# Patient Record
Sex: FEMALE | Race: BLACK OR AFRICAN AMERICAN | Employment: PART TIME | URBAN - METROPOLITAN AREA
[De-identification: names, ages, dates, MRNs, and addresses within clinical notes are randomized per-mention and may not be internally consistent; named-entity substitution may affect disease eponyms.]

---

## 2017-08-12 ENCOUNTER — HOSPITAL ENCOUNTER (OUTPATIENT)
Dept: MAMMOGRAPHY | Age: 67
Discharge: HOME | End: 2017-08-12
Payer: COMMERCIAL

## 2017-08-12 DIAGNOSIS — Z13.9 SCREENING FOR CONDITION: ICD-10-CM

## 2017-08-12 PROCEDURE — 77063 BREAST TOMOSYNTHESIS BI: CPT

## 2017-08-24 ENCOUNTER — HOSPITAL ENCOUNTER (OUTPATIENT)
Dept: MAMMOGRAPHY | Age: 67
Discharge: HOME | End: 2017-08-24
Payer: COMMERCIAL

## 2017-08-24 DIAGNOSIS — Z13.820 SCREENING FOR OSTEOPOROSIS: ICD-10-CM

## 2017-08-24 PROCEDURE — 77080 DXA BONE DENSITY AXIAL: CPT

## 2018-08-16 ENCOUNTER — HOSPITAL ENCOUNTER (OUTPATIENT)
Dept: MAMMOGRAPHY | Age: 68
Discharge: HOME | End: 2018-08-18
Payer: COMMERCIAL

## 2018-08-16 DIAGNOSIS — Z12.39 SCREENING BREAST EXAMINATION: ICD-10-CM

## 2018-08-16 PROCEDURE — 77067 SCR MAMMO BI INCL CAD: CPT

## 2019-01-24 ENCOUNTER — HOSPITAL ENCOUNTER (EMERGENCY)
Age: 69
Discharge: HOME | End: 2019-01-24
Attending: EMERGENCY MEDICINE
Payer: MEDICARE

## 2019-01-24 VITALS
SYSTOLIC BLOOD PRESSURE: 155 MMHG | DIASTOLIC BLOOD PRESSURE: 85 MMHG | RESPIRATION RATE: 20 BRPM | TEMPERATURE: 98.5 F | BODY MASS INDEX: 34.31 KG/M2 | OXYGEN SATURATION: 100 % | WEIGHT: 201 LBS | HEART RATE: 83 BPM | HEIGHT: 64 IN

## 2019-01-24 DIAGNOSIS — R04.0 EPISTAXIS: Primary | ICD-10-CM

## 2019-01-24 PROCEDURE — 99283 EMERGENCY DEPT VISIT LOW MDM: CPT

## 2019-01-24 PROCEDURE — 6370000000 HC RX 637 (ALT 250 FOR IP): Performed by: NURSE PRACTITIONER

## 2019-01-24 RX ORDER — LISINOPRIL AND HYDROCHLOROTHIAZIDE 20; 12.5 MG/1; MG/1
1 TABLET ORAL DAILY
COMMUNITY
End: 2021-11-01

## 2019-01-24 RX ORDER — CELECOXIB 200 MG/1
200 CAPSULE ORAL 2 TIMES DAILY
COMMUNITY
End: 2021-11-01

## 2019-01-24 RX ORDER — OXYMETAZOLINE HYDROCHLORIDE 0.05 G/100ML
2 SPRAY NASAL 2 TIMES DAILY
Status: COMPLETED | OUTPATIENT
Start: 2019-01-24 | End: 2019-01-24

## 2019-01-24 RX ORDER — ASPIRIN 81 MG/1
81 TABLET, CHEWABLE ORAL DAILY
COMMUNITY
End: 2021-11-01

## 2019-01-24 RX ORDER — OXYBUTYNIN CHLORIDE 5 MG/1
5 TABLET ORAL 3 TIMES DAILY
COMMUNITY
End: 2021-11-01

## 2019-01-24 RX ORDER — AMLODIPINE BESYLATE 2.5 MG/1
2.5 TABLET ORAL DAILY
COMMUNITY
End: 2021-11-01

## 2019-01-24 RX ADMIN — OXYMETAZOLINE HYDROCHLORIDE 2 SPRAY: 5 SPRAY NASAL at 13:21

## 2019-01-25 ASSESSMENT — ENCOUNTER SYMPTOMS
SORE THROAT: 0
SINUS PRESSURE: 0
RHINORRHEA: 0
NAUSEA: 0
ABDOMINAL PAIN: 0
VOMITING: 0
SHORTNESS OF BREATH: 0
CONSTIPATION: 0
DIARRHEA: 0
COUGH: 0
WHEEZING: 0
COLOR CHANGE: 0

## 2021-11-01 ENCOUNTER — OFFICE VISIT (OUTPATIENT)
Dept: PODIATRY | Age: 71
End: 2021-11-01
Payer: MEDICARE

## 2021-11-01 VITALS — HEIGHT: 66 IN | WEIGHT: 204 LBS | BODY MASS INDEX: 32.78 KG/M2 | RESPIRATION RATE: 16 BRPM

## 2021-11-01 DIAGNOSIS — D23.71 BENIGN NEOPLASM OF SKIN OF RIGHT FOOT: Primary | ICD-10-CM

## 2021-11-01 DIAGNOSIS — M79.605 PAIN IN BOTH LOWER EXTREMITIES: ICD-10-CM

## 2021-11-01 DIAGNOSIS — D23.72 BENIGN NEOPLASM OF SKIN OF LEFT FOOT: ICD-10-CM

## 2021-11-01 DIAGNOSIS — M79.604 PAIN IN BOTH LOWER EXTREMITIES: ICD-10-CM

## 2021-11-01 PROCEDURE — G8484 FLU IMMUNIZE NO ADMIN: HCPCS | Performed by: PODIATRIST

## 2021-11-01 PROCEDURE — 1090F PRES/ABSN URINE INCON ASSESS: CPT | Performed by: PODIATRIST

## 2021-11-01 PROCEDURE — G8417 CALC BMI ABV UP PARAM F/U: HCPCS | Performed by: PODIATRIST

## 2021-11-01 PROCEDURE — G8427 DOCREV CUR MEDS BY ELIG CLIN: HCPCS | Performed by: PODIATRIST

## 2021-11-01 PROCEDURE — 99203 OFFICE O/P NEW LOW 30 MIN: CPT | Performed by: PODIATRIST

## 2021-11-01 PROCEDURE — 4040F PNEUMOC VAC/ADMIN/RCVD: CPT | Performed by: PODIATRIST

## 2021-11-01 PROCEDURE — 1123F ACP DISCUSS/DSCN MKR DOCD: CPT | Performed by: PODIATRIST

## 2021-11-01 PROCEDURE — 3017F COLORECTAL CA SCREEN DOC REV: CPT | Performed by: PODIATRIST

## 2021-11-01 PROCEDURE — G8399 PT W/DXA RESULTS DOCUMENT: HCPCS | Performed by: PODIATRIST

## 2021-11-01 PROCEDURE — 17110 DESTRUCTION B9 LES UP TO 14: CPT | Performed by: PODIATRIST

## 2021-11-01 PROCEDURE — 4004F PT TOBACCO SCREEN RCVD TLK: CPT | Performed by: PODIATRIST

## 2021-11-01 RX ORDER — TRAMADOL HYDROCHLORIDE 50 MG/1
TABLET ORAL
COMMUNITY
Start: 2021-08-09

## 2021-11-01 RX ORDER — SOLIFENACIN SUCCINATE 5 MG/1
TABLET, FILM COATED ORAL
COMMUNITY
Start: 2021-10-05

## 2021-11-01 RX ORDER — LOSARTAN POTASSIUM 100 MG/1
100 TABLET ORAL DAILY
COMMUNITY
Start: 2021-08-09

## 2021-11-01 NOTE — PROGRESS NOTES
[facility] PHYSICIANS  [facility]  [address] [facility] [address] [place] [address]  Dept: [phone]  Dept Fax: [phone]    NEW PATIENT PROGRESS NOTE  Date of patient's visit: 11/1/2021  Patient's Name:  Laura Chilel YOB: 1950            Patient Care Team:  CATHI Price CNP as PCP - General (Family Nurse Practitioner)  Samia Webb DPM as Physician (Podiatry)        Chief Complaint   Patient presents with    New Patient    Foot Pain    Benign Neoplasm         HPI:   Laura Chilel is a 78 y.o. female who presents to the office today complaining of bilateral foot pain due to lesions. Symptoms began many year(s) ago. Patient relates pain is Present. Pain is rated 1 out of 10 and is described as intermittent, mild. Treatments prior to today's visit include: none. Currently denies F/C/N/V. Pt's primary care physician is CATHI Price CNP last seen 06/18/2021     Allergies   Allergen Reactions    Aleve [Naproxen Sodium]      nosebleed    Lisinopril-Hydrochlorothiazide Swelling    Neurontin [Gabapentin]      halucinations    Sulfamethoxazole-Trimethoprim Swelling     Lip swelling       Past Medical History:   Diagnosis Date    Hypertension        Prior to Admission medications    Medication Sig Start Date End Date Taking? Authorizing Provider   losartan (COZAAR) 100 MG tablet  8/9/21  Yes Historical Provider, MD   solifenacin (VESICARE) 5 MG tablet  10/5/21  Yes Historical Provider, MD   traMADol (ULTRAM) 50 MG tablet  8/9/21  Yes Historical Provider, MD       Past Surgical History:   Procedure Laterality Date    HYSTERECTOMY      JOINT REPLACEMENT      bilateral knees       No family history on file.     Social History     Tobacco Use    Smoking status: Never Smoker   Substance Use Topics    Alcohol use: Yes     Comment: social       Review of Systems    Review of Systems:   History obtained from chart review and the patient  General ROS: negative for - chills, fatigue, fever, night sweats or weight gain  Constitutional: Negative for chills, diaphoresis, fatigue, fever and unexpected weight change. Musculoskeletal: Positive for arthralgias, gait problem and joint swelling. Neurological ROS: negative for - behavioral changes, confusion, headaches or seizures. Negative for weakness and numbness. Dermatological ROS: negative for - mole changes, rash  Cardiovascular: Negative for leg swelling. Gastrointestinal: Negative for constipation, diarrhea, nausea and vomiting. Lower Extremity Physical Examination:   Vitals:   Vitals:    11/01/21 0844   Resp: 16     General: AAO x 3 in NAD. Dermatologic Exam:  Soft tissue lesion to the plantar right and left foot with central core and petechiae. Pain on palpation of lesion. Musculoskeletal:     1st MPJ ROM decreased, Bilateral.  Muscle strength 5/5, Bilateral.  Pain present upon palpation of skin lesions. Medial longitudinal arch, Bilateral WNL. Ankle ROM WNL,Bilateral.    Dorsally contracted digits absent digits 1-5 Bilateral.     Vascular: DP and PT pulses palpable 2/4, Bilateral.  CFT <3 seconds, Bilateral.  Hair growth present to the level of the digits, Bilateral.  Edema absent, Bilateral.  Varicosities absent, Bilateral. Erythema absent, Bilateral    Neurological: Sensation intact to light touch to level of digits, Bilateral.  Protective sensation intact 10/10 sites via 5.07/10g Austin-Sobeida Monofilament, Bilateral.  negative Tinel's, Bilateral.  negative Valleix sign, Bilateral.      Integument: Warm, dry, supple, Bilateral.  Open lesion absent, Bilateral.  Interdigital maceration absent to web spaces 1-4, Bilateral.  Nails are normal in length, thickness and color 1-5 bilateral.  Fissures absent, Bilateral.       Asessment: Patient is a 78 y.o. female with:    Diagnosis Orders   1. Benign neoplasm of skin of right foot  89817 - CT DESTRUCTION BENIGN LESIONS UP TO 14   2. Benign neoplasm of skin of left foot  02147 - [place] DESTRUCTION BENIGN LESIONS UP TO 14   3. Pain in both lower extremities  53851 - [place] DESTRUCTION BENIGN LESIONS UP TO 14         Plan: Patient examined and evaluated. Current condition and treatment options discussed in detail. Discussed conservative and surgical options with the patient. The lesions were partially excised via 15 blade and silver nitrate was applied under occlusion. The patient tolerated the procedure well and without complication. Advised patient to use vasoline to the area after tomorrow to prevent surrounding tissue irritation. All labs were reviewed and all imagining including the above findings were reviewed PRIOR to the patients arrival and with the patient today. Previous patient encounter was reviewed. Encounters from the patients other medical providers were reviewed and noted. Time was spent educating the patienton proper care of the feet and ankles. All the above diagnosis were addressed at todays visit and all questions were answered. A total of 30 minutes was spent with this patients encounter which included charting after the patients visit    Advised pt to avoid walking barefoot. Verbal and written instructions given to patient. Contact office with any questions/problems/concerns. RTC in 2month(s).     11/1/2021    Electronically signed by Maricarmen Underwood DPM on 11/1/2021 at 8:50 AM  11/1/2021

## 2022-05-18 ENCOUNTER — HOSPITAL ENCOUNTER (OUTPATIENT)
Dept: PREADMISSION TESTING | Age: 72
Discharge: HOME | End: 2022-05-22
Payer: MEDICARE

## 2022-05-18 VITALS
WEIGHT: 200 LBS | HEART RATE: 73 BPM | RESPIRATION RATE: 20 BRPM | OXYGEN SATURATION: 97 % | TEMPERATURE: 98.6 F | DIASTOLIC BLOOD PRESSURE: 77 MMHG | BODY MASS INDEX: 35.44 KG/M2 | SYSTOLIC BLOOD PRESSURE: 155 MMHG | HEIGHT: 63 IN

## 2022-05-18 LAB
ANION GAP SERPL CALCULATED.3IONS-SCNC: 10 MMOL/L (ref 9–17)
BUN BLDV-MCNC: 18 MG/DL (ref 8–23)
BUN/CREAT BLD: 29 (ref 9–20)
CALCIUM SERPL-MCNC: 9 MG/DL (ref 8.6–10.4)
CHLORIDE BLD-SCNC: 105 MMOL/L (ref 98–107)
CO2: 23 MMOL/L (ref 20–31)
CREAT SERPL-MCNC: 0.63 MG/DL (ref 0.5–0.9)
EKG ATRIAL RATE: 66 BPM
EKG P AXIS: 46 DEGREES
EKG P-R INTERVAL: 146 MS
EKG Q-T INTERVAL: 408 MS
EKG QRS DURATION: 88 MS
EKG QTC CALCULATION (BAZETT): 427 MS
EKG R AXIS: 60 DEGREES
EKG T AXIS: 36 DEGREES
EKG VENTRICULAR RATE: 66 BPM
GFR AFRICAN AMERICAN: >60 ML/MIN
GFR NON-AFRICAN AMERICAN: >60 ML/MIN
GFR SERPL CREATININE-BSD FRML MDRD: ABNORMAL ML/MIN/{1.73_M2}
GLUCOSE BLD-MCNC: 99 MG/DL (ref 70–99)
HCT VFR BLD CALC: 43.8 % (ref 36.3–47.1)
HEMOGLOBIN: 13.7 G/DL (ref 11.9–15.1)
MCH RBC QN AUTO: 26.3 PG (ref 25.2–33.5)
MCHC RBC AUTO-ENTMCNC: 31.3 G/DL (ref 28.4–34.8)
MCV RBC AUTO: 84.2 FL (ref 82.6–102.9)
NRBC AUTOMATED: 0 PER 100 WBC
PDW BLD-RTO: 14.6 % (ref 11.8–14.4)
PLATELET # BLD: 219 K/UL (ref 138–453)
PMV BLD AUTO: 9.2 FL (ref 8.1–13.5)
POTASSIUM SERPL-SCNC: 4.1 MMOL/L (ref 3.7–5.3)
RBC # BLD: 5.2 M/UL (ref 3.95–5.11)
SODIUM BLD-SCNC: 138 MMOL/L (ref 135–144)
WBC # BLD: 5.1 K/UL (ref 3.5–11.3)

## 2022-05-18 PROCEDURE — 93005 ELECTROCARDIOGRAM TRACING: CPT | Performed by: ANESTHESIOLOGY

## 2022-05-18 PROCEDURE — 36415 COLL VENOUS BLD VENIPUNCTURE: CPT

## 2022-05-18 PROCEDURE — 85027 COMPLETE CBC AUTOMATED: CPT

## 2022-05-18 PROCEDURE — 80048 BASIC METABOLIC PNL TOTAL CA: CPT

## 2022-05-18 ASSESSMENT — PAIN SCALES - GENERAL: PAINLEVEL_OUTOF10: 0

## 2022-05-18 NOTE — H&P
Lisinopril-hydrochlorothiazide, Neurontin [gabapentin], and Sulfamethoxazole-trimethoprim    Social History:     Tobacco:    reports that she has never smoked. She has never used smokeless tobacco.  Alcohol:      reports current alcohol use. Drug Use:  reports no history of drug use. Family History:     History reviewed. No pertinent family history. Review of Systems:     Positive and Negative as described in HPI. CONSTITUTIONAL: Negative for fevers, chills, sweats, fatigue, and weight loss. HEENT: Cataract with removal. Wears glasses. Negative for glasses, hearing changes, rhinorrhea, and throat pain. RESPIRATORY: Negative for shortness of breath, cough, congestion, and wheezing. CARDIOVASCULAR: HTN. Negative for chest pain, blood clot, irregular heartbeat, and palpitations. GASTROINTESTINAL: Negative for reflux, nausea, vomiting, diarrhea, constipation, change in bowel habits, and abdominal pain. GENITOURINARY: Frequency. Negative for difficulty of urination, burning with urination  INTEGUMENT: Easy bruising. Negative for rash, skin lesions  HEMATOLOGIC/LYMPHATIC: Bilateral ankle swelling in warm weather. ALLERGIC/IMMUNOLOGIC:  Negative for urticaria and itching. ENDOCRINE: Negative for increase in thirst, increase in urination, and heat or cold intolerance. MUSCULOSKELETAL: See HPI. Arthritis. NEUROLOGICAL: Negative for headaches, dizziness, lightheadedness, numbness, and tingling extremities. BEHAVIOR/PSYCH: Negative for depression and anxiety. Physical Exam:   BP (!) 155/77   Pulse 73   Temp 98.6 °F (37 °C) (Temporal)   Resp 20   Ht 5' 3\" (1.6 m)   Wt 200 lb (90.7 kg)   SpO2 97%   BMI 35.43 kg/m²   No LMP recorded. Patient has had a hysterectomy. No obstetric history on file. No results for input(s): POCGLU in the last 72 hours. General Appearance:  Alert, well appearing, and in no acute distress. Mental status: Oriented to person, place, and time.   Head: Normocephalic and atraumatic. Eye: Wearing reading glasses. No icterus, redness, pupils equal and reactive, extraocular eye movements intact, and conjunctiva clear. Ear:  Hearing grossly intact. Nose:  No drainage noted. Mouth:  Mucous membranes moist.  Neck:  Supple and no carotid bruits noted. Lungs:  Bilateral equal air entry, clear to auscultation, no wheezing, rales or rhonchi, and normal effort. Cardiovascular:  Normal rate, regular rhythm, no murmur, gallop, or rub. Abdomen:  Soft, nontender, nondistended, and active bowel sounds. Neurologic:  Normal speech and cranial nerves II through XII grossly intact. Strength 5/5 bilaterally. Skin:  No gross lesions, rashes, bruising, or bleeding on exposed skin area. Extremities: Trace edema bilateral lower extremities. Posterior tibial pulses 2+ bilaterally. No calf tenderness with palpation. Psych: Normal affect.      Investigations:      Laboratory Testing:  Recent Results (from the past 24 hour(s))   Basic Metabolic Panel    Collection Time: 05/18/22 10:31 AM   Result Value Ref Range    Glucose 99 70 - 99 mg/dL    BUN 18 8 - 23 mg/dL    CREATININE 0.63 0.50 - 0.90 mg/dL    Bun/Cre Ratio 29 (H) 9 - 20    Calcium 9.0 8.6 - 10.4 mg/dL    Sodium 138 135 - 144 mmol/L    Potassium 4.1 3.7 - 5.3 mmol/L    Chloride 105 98 - 107 mmol/L    CO2 23 20 - 31 mmol/L    Anion Gap 10 9 - 17 mmol/L    GFR Non-African American >60 >60 mL/min    GFR African American >60 >60 mL/min    GFR Comment         CBC    Collection Time: 05/18/22 10:31 AM   Result Value Ref Range    WBC 5.1 3.5 - 11.3 k/uL    RBC 5.20 (H) 3.95 - 5.11 m/uL    Hemoglobin 13.7 11.9 - 15.1 g/dL    Hematocrit 43.8 36.3 - 47.1 %    MCV 84.2 82.6 - 102.9 fL    MCH 26.3 25.2 - 33.5 pg    MCHC 31.3 28.4 - 34.8 g/dL    RDW 14.6 (H) 11.8 - 14.4 %    Platelets 767 714 - 472 k/uL    MPV 9.2 8.1 - 13.5 fL    NRBC Automated 0.0 0.0 per 100 WBC   EKG 12 Lead    Collection Time: 05/18/22 10:37 AM   Result Value Ref Range    Ventricular Rate 66 BPM    Atrial Rate 66 BPM    P-R Interval 146 ms    QRS Duration 88 ms    Q-T Interval 408 ms    QTc Calculation (Bazett) 427 ms    P Axis 46 degrees    R Axis 60 degrees    T Axis 36 degrees       Recent Labs     22  1031   HGB 13.7   HCT 43.8   WBC 5.1   MCV 84.2      K 4.1      CO2 23   BUN 18   CREATININE 0.63   GLUCOSE 99       No results for input(s): COVID19 in the last 720 hours. *Please note that labs listed above are the most recent lab values available in EPIC at the time of the visit and additional labs may have been drawn or resulted since that time. Imaging/Diagnostics:    No results found. EK2022: See Epic. Diagnosis:      1. 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  ARTHROPLASTY  RIGHT 5TH TOE      Lucia Be, CATHI - CNP  2022  11:06 AM

## 2022-05-18 NOTE — H&P (VIEW-ONLY)
History and Physical Service   [place] [address]    HISTORY AND PHYSICAL EXAMINATION            Date of Evaluation: 5/18/2022  Patient name:  Mir Fuentes  MRN:   [id number]  YOB: 1950  PCP:    CATHI Carroll CNP    History Obtained From:     Patient, medical records    History of Present Illness: This is Mir Fuentes a 73 y.o. female who presents for a pre-admission testing appointment for an upcoming ARTHROPLASTY  RIGHT 5TH TOE by Lucila Carranza DPM scheduled on 6/2/2022 at 1405 due to [address]. The patient's chief complaint is right foot pain that has progressively worsened over the past several months. Right foot pain is aggravated by wearing shoes and is minimally relieved with rest, taking off her shoes. Denies recent falls and injuries. Functional Capacity per pt:   1) Pt is able to walk 2 city blocks on level ground without SOB. 2) Pt is able to climb 2 flights of stairs without SOB. 3) Pt is able to walk up a hill for 1-2 city blocks without SOB. Past Medical History:     Past Medical History:   Diagnosis Date    Arthritis     Cataract     Hypertension     Incontinence     Wears glasses         Past Surgical History:     Past Surgical History:   Procedure Laterality Date    BREAST BIOPSY Left     markers in place    CATARACT REMOVAL WITH IMPLANT Bilateral     COLONOSCOPY      CYSTOSCOPY      EYE SURGERY      HYSTERECTOMY      JOINT REPLACEMENT      bilateral knees    TOE SURGERY Bilateral     TONSILLECTOMY          Medications Prior to Admission:     Prior to Admission medications    Medication Sig Start Date End Date Taking?  Authorizing Provider   losartan (COZAAR) 100 MG tablet  8/9/21   Historical Provider, MD   solifenacin (VESICARE) 5 MG tablet  10/5/21   Historical Provider, MD   traMADol Birder Mo) 50 MG tablet  8/9/21   Historical Provider, MD        Allergies:     Aleve [naproxen sodium], Lisinopril-hydrochlorothiazide, Neurontin [gabapentin], and Sulfamethoxazole-trimethoprim    Social History:     Tobacco:    reports that she has never smoked. She has never used smokeless tobacco.  Alcohol:      reports current alcohol use. Drug Use:  reports no history of drug use. Family History:     History reviewed. No pertinent family history. Review of Systems:     Positive and Negative as described in HPI. CONSTITUTIONAL: Negative for fevers, chills, sweats, fatigue, and weight loss. HEENT: Cataract with removal. Wears glasses. Negative for glasses, hearing changes, rhinorrhea, and throat pain. RESPIRATORY: Negative for shortness of breath, cough, congestion, and wheezing. CARDIOVASCULAR: HTN. Negative for chest pain, blood clot, irregular heartbeat, and palpitations. GASTROINTESTINAL: Negative for reflux, nausea, vomiting, diarrhea, constipation, change in bowel habits, and abdominal pain. GENITOURINARY: Frequency. Negative for difficulty of urination, burning with urination  INTEGUMENT: Easy bruising. Negative for rash, skin lesions  HEMATOLOGIC/LYMPHATIC: Bilateral ankle swelling in warm weather. ALLERGIC/IMMUNOLOGIC:  Negative for urticaria and itching. ENDOCRINE: Negative for increase in thirst, increase in urination, and heat or cold intolerance. MUSCULOSKELETAL: See HPI. Arthritis. NEUROLOGICAL: Negative for headaches, dizziness, lightheadedness, numbness, and tingling extremities. BEHAVIOR/PSYCH: Negative for depression and anxiety. Physical Exam:   BP (!) 155/77   Pulse 73   Temp 98.6 °F (37 °C) (Temporal)   Resp 20   Ht 5' 3\" (1.6 m)   Wt 200 lb (90.7 kg)   SpO2 97%   BMI 35.43 kg/m²   No LMP recorded. Patient has had a hysterectomy. No obstetric history on file. No results for input(s): POCGLU in the last 72 hours. General Appearance:  Alert, well appearing, and in no acute distress. Mental status: Oriented to person, place, and time.   Head: Normocephalic and atraumatic. Eye: Wearing reading glasses. No icterus, redness, pupils equal and reactive, extraocular eye movements intact, and conjunctiva clear. Ear:  Hearing grossly intact. Nose:  No drainage noted. Mouth:  Mucous membranes moist.  Neck:  Supple and no carotid bruits noted. Lungs:  Bilateral equal air entry, clear to auscultation, no wheezing, rales or rhonchi, and normal effort. Cardiovascular:  Normal rate, regular rhythm, no murmur, gallop, or rub. Abdomen:  Soft, nontender, nondistended, and active bowel sounds. Neurologic:  Normal speech and cranial nerves II through XII grossly intact. Strength 5/5 bilaterally. Skin:  No gross lesions, rashes, bruising, or bleeding on exposed skin area. Extremities: Trace edema bilateral lower extremities. Posterior tibial pulses 2+ bilaterally. No calf tenderness with palpation. Psych: Normal affect.      Investigations:      Laboratory Testing:  Recent Results (from the past 24 hour(s))   Basic Metabolic Panel    Collection Time: 05/18/22 10:31 AM   Result Value Ref Range    Glucose 99 70 - 99 mg/dL    BUN 18 8 - 23 mg/dL    CREATININE 0.63 0.50 - 0.90 mg/dL    Bun/Cre Ratio 29 (H) 9 - 20    Calcium 9.0 8.6 - 10.4 mg/dL    Sodium 138 135 - 144 mmol/L    Potassium 4.1 3.7 - 5.3 mmol/L    Chloride 105 98 - 107 mmol/L    CO2 23 20 - 31 mmol/L    Anion Gap 10 9 - 17 mmol/L    GFR Non-African American >60 >60 mL/min    GFR African American >60 >60 mL/min    GFR Comment         CBC    Collection Time: 05/18/22 10:31 AM   Result Value Ref Range    WBC 5.1 3.5 - 11.3 k/uL    RBC 5.20 (H) 3.95 - 5.11 m/uL    Hemoglobin 13.7 11.9 - 15.1 g/dL    Hematocrit 43.8 36.3 - 47.1 %    MCV 84.2 82.6 - 102.9 fL    MCH 26.3 25.2 - 33.5 pg    MCHC 31.3 28.4 - 34.8 g/dL    RDW 14.6 (H) 11.8 - 14.4 %    Platelets 763 481 - 271 k/uL    MPV 9.2 8.1 - 13.5 fL    NRBC Automated 0.0 0.0 per 100 WBC   EKG 12 Lead    Collection Time: 05/18/22 10:37 AM   Result Value Ref Range    Ventricular Rate 66 BPM    Atrial Rate 66 BPM    P-R Interval 146 ms    QRS Duration 88 ms    Q-T Interval 408 ms    QTc Calculation (Bazett) 427 ms    P Axis 46 degrees    R Axis 60 degrees    T Axis 36 degrees       Recent Labs     22  1031   HGB 13.7   HCT 43.8   WBC 5.1   MCV 84.2      K 4.1      CO2 23   BUN 18   CREATININE 0.63   GLUCOSE 99       No results for input(s): COVID19 in the last 720 hours. *Please note that labs listed above are the most recent lab values available in EPIC at the time of the visit and additional labs may have been drawn or resulted since that time. Imaging/Diagnostics:    No results found. EK2022: See Epic. Diagnosis:      1. 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  ARTHROPLASTY  RIGHT 5TH TOE      CATHI Chávez - CNP  2022  11:06 AM

## 2022-05-18 NOTE — PRE-PROCEDURE INSTRUCTIONS
ARRIVE AT [facility] [address] ON Thursday June 2nd  Arrive at 12:30  Any questions call [phone]    Once you enter the hospital lobby, take the elevators to the second floor. Check-In is at the surgery registration desk. Continue to take your home medications as you normally do up to and including the night before surgery with the exception of any blood thinning medications. Please stop any blood thinning medications as directed by your surgeon or prescribing physician. Failure to stop certain medications may interfere with your scheduled surgery. These may include:  Aspirin, Warfarin (Coumadin), Clopidogrel (Plavix), Ibuprofen (Motrin, Advil), Naproxen (Aleve), Meloxicam (Mobic), Celecoxib (Celebrex), Eliquis, Pradaxa, Xarelto, Effient, Fish Oil, Herbal supplements. Please take the following medication(s) the day of surgery with a small sip of water:  none    PREPARING FOR YOUR SURGERY:     Before surgery, you can play an important role in your own health. Because skin is not sterile, we need to be sure that your skin is as free of germs as possible before surgery by carefully washing before surgery. Preparing or prepping skin before surgery can reduce the risk of a surgical site infection.   Do not shave the area of your body where your surgery will be performed unless you received specific permission from your physician. You will need to shower at home the night before surgery and the morning of surgery with a special soap called chlorhexidine gluconate (CHG*). *Not to be used by people allergic to Chlorhexidine Gluconate (CHG). Following these instructions will help you be sure that your skin is clean before surgery. Instructions on cleaning your skin before surgery: The night before your surgery:      You will need to shower with warm water (not hot) and the CHG soap.  Use a clean wash cloth and a clean towel. Have clean clothes available to put on after the shower.   First wash your hair with regular shampoo. Rinse your hair and body thoroughly to remove the shampoo. Jovani Loser Wash your face with your regular soap or water only. Thoroughly rinse your body with warm water from the neck down.  Turn water off to prevent rinsing the soap off too soon.  With a clean wet washcloth and half of the CHG soap in the bottle, lather your entire body from the neck down. Do not use CHG soap near your eyes or ears to avoid injury to those areas.  Wash thoroughly, paying special attention to the area where your surgery will be performed.  Wash your body gently for five (5) minutes. Avoid scrubbing your skin too hard.  Turn the water back on and rinse your body thoroughly.  Pat yourself dry with a clean, soft towel. Do not apply lotion, cream or powder.  Dress with clean freshly washed clothes. The morning of surgery:     Repeat shower following steps above - using remaining half of CHG soap in bottle. Patient Instructions:    Jovani Loser If you are having any type of anesthesia you are to have nothing to eat or drink after midnight the night before your surgery. This includes gum, mints, water or smoking or chewing tobacco.  The only exception to this is a small sip of water to take with any morning dose of heart, blood pressure, or seizure medications. No alcoholic beverages for 24 hours prior to surgery.  Brush your teeth but do not swallow water.  Bring your eyeglasses and case with you. No contacts are to be worn the day of surgery. You also may bring your hearing aids. Most surgical procedures involving anesthesia will require that you remove your dentures prior to surgery. · Do not wear any jewelry or body piercings day of surgery. Also, NO lotion, perfume or deodorant to be used the day of surgery. No nail polish on the operative extremity (arm/leg surgeries)    · Do not bring any valuables such as jewelry, cash, or credit cards.   If you are staying overnight with us, please bring a small bag of personal items.  Please wear loose, comfortable clothing. If you are potentially going to have a cast or brace bring clothing that will fit over them.  In case of illness - If you have cold or flu like symptoms (high fever, runny nose, sore throat, cough, etc.) rash, nausea, vomiting, loose stools, and/or recent contact with someone who has a contagious disease (chicken pox, measles, etc.) Please call your doctor before coming to the hospital.     Day of Surgery/Procedure:    As a patient at [facility] - INPATIENT you can expect quality medical and nursing care that is centered on your individual needs. Our goal is to make your surgical experience as comfortable as possible    . Transportation After Your Surgery/Procedure: You will need a friend or family member to drive you home after your procedure. Your  must be 25years of age or older and able to sign off on your discharge instructions. A taxi cab or any other form of public transportation is not acceptable. Your friend or family member must stay at the hospital throughout your procedure. Someone must remain with you for the first 24 hours after your surgery if you receive anesthesia or medication. If you do not have someone to stay with you, your procedure may be cancelled.       If you have any other questions regarding your procedure or the day of surgery, please call [phone]      _________________________  ____________________________  Signature (Patient)              Signature (Provider) & date

## 2022-06-02 ENCOUNTER — HOSPITAL ENCOUNTER (OUTPATIENT)
Age: 72
Setting detail: OUTPATIENT SURGERY
Discharge: HOME | End: 2022-06-02
Attending: PODIATRIST | Admitting: PODIATRIST
Payer: MEDICARE

## 2022-06-02 ENCOUNTER — ANESTHESIA (OUTPATIENT)
Dept: OPERATING ROOM | Age: 72
End: 2022-06-02
Payer: MEDICARE

## 2022-06-02 ENCOUNTER — ANESTHESIA EVENT (OUTPATIENT)
Dept: OPERATING ROOM | Age: 72
End: 2022-06-02
Payer: MEDICARE

## 2022-06-02 VITALS
WEIGHT: 200 LBS | TEMPERATURE: 97 F | OXYGEN SATURATION: 99 % | HEIGHT: 63 IN | SYSTOLIC BLOOD PRESSURE: 144 MMHG | DIASTOLIC BLOOD PRESSURE: 88 MMHG | RESPIRATION RATE: 17 BRPM | BODY MASS INDEX: 35.44 KG/M2 | HEART RATE: 66 BPM

## 2022-06-02 DIAGNOSIS — G89.18 ACUTE POST-OPERATIVE PAIN: Primary | ICD-10-CM

## 2022-06-02 DIAGNOSIS — Z98.890 STATUS POST RIGHT FOOT SURGERY: ICD-10-CM

## 2022-06-02 PROCEDURE — 2580000003 HC RX 258: Performed by: ANESTHESIOLOGY

## 2022-06-02 PROCEDURE — 2720000010 HC SURG SUPPLY STERILE: Performed by: PODIATRIST

## 2022-06-02 PROCEDURE — 2500000003 HC RX 250 WO HCPCS: Performed by: NURSE ANESTHETIST, CERTIFIED REGISTERED

## 2022-06-02 PROCEDURE — 6360000002 HC RX W HCPCS: Performed by: NURSE ANESTHETIST, CERTIFIED REGISTERED

## 2022-06-02 PROCEDURE — 3700000000 HC ANESTHESIA ATTENDED CARE: Performed by: PODIATRIST

## 2022-06-02 PROCEDURE — 7100000010 HC PHASE II RECOVERY - FIRST 15 MIN: Performed by: PODIATRIST

## 2022-06-02 PROCEDURE — 7100000011 HC PHASE II RECOVERY - ADDTL 15 MIN: Performed by: PODIATRIST

## 2022-06-02 PROCEDURE — 2709999900 HC NON-CHARGEABLE SUPPLY: Performed by: PODIATRIST

## 2022-06-02 PROCEDURE — 3700000001 HC ADD 15 MINUTES (ANESTHESIA): Performed by: PODIATRIST

## 2022-06-02 PROCEDURE — 2500000003 HC RX 250 WO HCPCS: Performed by: PODIATRIST

## 2022-06-02 PROCEDURE — 3600000002 HC SURGERY LEVEL 2 BASE: Performed by: PODIATRIST

## 2022-06-02 PROCEDURE — 3600000012 HC SURGERY LEVEL 2 ADDTL 15MIN: Performed by: PODIATRIST

## 2022-06-02 RX ORDER — FENTANYL CITRATE 50 UG/ML
INJECTION, SOLUTION INTRAMUSCULAR; INTRAVENOUS PRN
Status: DISCONTINUED | OUTPATIENT
Start: 2022-06-02 | End: 2022-06-02 | Stop reason: SDUPTHER

## 2022-06-02 RX ORDER — LIDOCAINE HYDROCHLORIDE 10 MG/ML
1 INJECTION, SOLUTION EPIDURAL; INFILTRATION; INTRACAUDAL; PERINEURAL
Status: DISCONTINUED | OUTPATIENT
Start: 2022-06-03 | End: 2022-06-02 | Stop reason: HOSPADM

## 2022-06-02 RX ORDER — FENTANYL CITRATE 50 UG/ML
25 INJECTION, SOLUTION INTRAMUSCULAR; INTRAVENOUS EVERY 5 MIN PRN
Status: DISCONTINUED | OUTPATIENT
Start: 2022-06-02 | End: 2022-06-02 | Stop reason: HOSPADM

## 2022-06-02 RX ORDER — LIDOCAINE HYDROCHLORIDE 10 MG/ML
INJECTION, SOLUTION EPIDURAL; INFILTRATION; INTRACAUDAL; PERINEURAL PRN
Status: DISCONTINUED | OUTPATIENT
Start: 2022-06-02 | End: 2022-06-02 | Stop reason: ALTCHOICE

## 2022-06-02 RX ORDER — LIDOCAINE HYDROCHLORIDE 20 MG/ML
INJECTION, SOLUTION EPIDURAL; INFILTRATION; INTRACAUDAL; PERINEURAL PRN
Status: DISCONTINUED | OUTPATIENT
Start: 2022-06-02 | End: 2022-06-02 | Stop reason: SDUPTHER

## 2022-06-02 RX ORDER — BUPIVACAINE HYDROCHLORIDE 5 MG/ML
INJECTION, SOLUTION EPIDURAL; INTRACAUDAL PRN
Status: DISCONTINUED | OUTPATIENT
Start: 2022-06-02 | End: 2022-06-02 | Stop reason: ALTCHOICE

## 2022-06-02 RX ORDER — MIDAZOLAM HYDROCHLORIDE 1 MG/ML
INJECTION INTRAMUSCULAR; INTRAVENOUS PRN
Status: DISCONTINUED | OUTPATIENT
Start: 2022-06-02 | End: 2022-06-02 | Stop reason: SDUPTHER

## 2022-06-02 RX ORDER — OXYCODONE HYDROCHLORIDE AND ACETAMINOPHEN 5; 325 MG/1; MG/1
1 TABLET ORAL EVERY 6 HOURS PRN
Qty: 28 TABLET | Refills: 0 | Status: SHIPPED | OUTPATIENT
Start: 2022-06-02 | End: 2022-06-09

## 2022-06-02 RX ORDER — SODIUM CHLORIDE 9 MG/ML
INJECTION, SOLUTION INTRAVENOUS PRN
Status: DISCONTINUED | OUTPATIENT
Start: 2022-06-02 | End: 2022-06-02 | Stop reason: HOSPADM

## 2022-06-02 RX ORDER — PROPOFOL 10 MG/ML
INJECTION, EMULSION INTRAVENOUS PRN
Status: DISCONTINUED | OUTPATIENT
Start: 2022-06-02 | End: 2022-06-02 | Stop reason: SDUPTHER

## 2022-06-02 RX ORDER — SODIUM CHLORIDE 0.9 % (FLUSH) 0.9 %
5-40 SYRINGE (ML) INJECTION PRN
Status: DISCONTINUED | OUTPATIENT
Start: 2022-06-02 | End: 2022-06-02 | Stop reason: HOSPADM

## 2022-06-02 RX ORDER — SODIUM CHLORIDE 0.9 % (FLUSH) 0.9 %
5-40 SYRINGE (ML) INJECTION EVERY 12 HOURS SCHEDULED
Status: DISCONTINUED | OUTPATIENT
Start: 2022-06-02 | End: 2022-06-02 | Stop reason: HOSPADM

## 2022-06-02 RX ORDER — SODIUM CHLORIDE, SODIUM LACTATE, POTASSIUM CHLORIDE, CALCIUM CHLORIDE 600; 310; 30; 20 MG/100ML; MG/100ML; MG/100ML; MG/100ML
INJECTION, SOLUTION INTRAVENOUS CONTINUOUS
Status: DISCONTINUED | OUTPATIENT
Start: 2022-06-03 | End: 2022-06-02 | Stop reason: HOSPADM

## 2022-06-02 RX ORDER — HYDROMORPHONE HYDROCHLORIDE 1 MG/ML
0.25 INJECTION, SOLUTION INTRAMUSCULAR; INTRAVENOUS; SUBCUTANEOUS EVERY 5 MIN PRN
Status: DISCONTINUED | OUTPATIENT
Start: 2022-06-02 | End: 2022-06-02 | Stop reason: HOSPADM

## 2022-06-02 RX ORDER — PROPOFOL 10 MG/ML
INJECTION, EMULSION INTRAVENOUS CONTINUOUS PRN
Status: DISCONTINUED | OUTPATIENT
Start: 2022-06-02 | End: 2022-06-02 | Stop reason: SDUPTHER

## 2022-06-02 RX ORDER — ONDANSETRON 2 MG/ML
4 INJECTION INTRAMUSCULAR; INTRAVENOUS
Status: DISCONTINUED | OUTPATIENT
Start: 2022-06-02 | End: 2022-06-02 | Stop reason: HOSPADM

## 2022-06-02 RX ORDER — CEFAZOLIN SODIUM 1 G/3ML
INJECTION, POWDER, FOR SOLUTION INTRAMUSCULAR; INTRAVENOUS PRN
Status: DISCONTINUED | OUTPATIENT
Start: 2022-06-02 | End: 2022-06-02 | Stop reason: SDUPTHER

## 2022-06-02 RX ADMIN — Medication 25 MCG: at 14:15

## 2022-06-02 RX ADMIN — SODIUM CHLORIDE, POTASSIUM CHLORIDE, SODIUM LACTATE AND CALCIUM CHLORIDE: 600; 310; 30; 20 INJECTION, SOLUTION INTRAVENOUS at 12:32

## 2022-06-02 RX ADMIN — PROPOFOL 50 MCG/KG/MIN: 10 INJECTION, EMULSION INTRAVENOUS at 14:08

## 2022-06-02 RX ADMIN — MIDAZOLAM 1 MG: 1 INJECTION INTRAMUSCULAR; INTRAVENOUS at 14:05

## 2022-06-02 RX ADMIN — LIDOCAINE HYDROCHLORIDE 100 MG: 20 INJECTION, SOLUTION EPIDURAL; INFILTRATION; INTRACAUDAL; PERINEURAL at 14:08

## 2022-06-02 RX ADMIN — PROPOFOL 50 MG: 10 INJECTION, EMULSION INTRAVENOUS at 14:08

## 2022-06-02 RX ADMIN — PROPOFOL 20 MG: 10 INJECTION, EMULSION INTRAVENOUS at 14:15

## 2022-06-02 RX ADMIN — MIDAZOLAM 1 MG: 1 INJECTION INTRAMUSCULAR; INTRAVENOUS at 14:15

## 2022-06-02 RX ADMIN — CEFAZOLIN SODIUM 2000 MG: 1 INJECTION, POWDER, FOR SOLUTION INTRAMUSCULAR; INTRAVENOUS at 14:13

## 2022-06-02 RX ADMIN — Medication 25 MCG: at 14:08

## 2022-06-02 ASSESSMENT — PAIN DESCRIPTION - DESCRIPTORS: DESCRIPTORS: ACHING

## 2022-06-02 ASSESSMENT — PAIN - FUNCTIONAL ASSESSMENT: PAIN_FUNCTIONAL_ASSESSMENT: 0-10

## 2022-06-02 NOTE — ANESTHESIA POSTPROCEDURE EVALUATION
Department of Anesthesiology  Postprocedure Note    Patient: Mal Solo  MRN: [id number]  YOB: 1950  Date of evaluation: 6/2/2022  Time:  4:27 PM     Procedure Summary     Date: 06/02/22 Room / Location: [address] [place] [address] - INPATIENT    Anesthesia Start: 7133 Anesthesia Stop: 4487    Procedure: ARTHROPLASTY  RIGHT 5TH TOE (Right Foot) Diagnosis: (DX DEFORMED 5TH RIGHT)    Surgeons: Jessie Inman DPM Responsible Provider: Milagros Pelayo MD    Anesthesia Type: MAC ASA Status: 2          Anesthesia Type: MAC    Mira Phase I:      Mira Phase II: Mira Score: 8    Last vitals: Reviewed and per EMR flowsheets.        Anesthesia Post Evaluation    Patient location during evaluation: PACU  Patient participation: complete - patient participated  Level of consciousness: awake  Airway patency: patent  Nausea & Vomiting: no nausea  Complications: no  Cardiovascular status: blood pressure returned to baseline  Respiratory status: acceptable  Hydration status: euvolemic  Comments: Multimodal analgesia pain management as indicated by procedure  Multimodal analgesia pain management approach

## 2022-06-02 NOTE — PROGRESS NOTES
Pharmacy Note - Renal dose adjustment made per P/T protocol    Original order:  Ancef 1g Pre-OP    Estimated Creatinine Clearance: 90 mL/min (based on SCr of 0.63 mg/dL). No results for input(s): BUN, CREATININE, PLT, INR in the last 72 hours. Renally adjusted order:  Ancef 2g Pre-OP based on weight of 90.7kg. Please call pharmacy with any questions.     Thank you,  Beryle Castellani, [facility]  6/2/2022 12:54 PM

## 2022-06-02 NOTE — INTERVAL H&P NOTE
Interval H&P Note    Pt Name: Nikole Marcelo  MRN: [id number]  YOB: 1950  Date of evaluation: 6/2/2022      [x] I have reviewed the H&P by JESIKA Be CNP for an Interval History and Physical note. [x] I have examined  Nikole Marcelo  There are no changes to the patient who is scheduled for ARTHROPLASTY  RIGHT 5TH TOE by Kae Manuel DPM for DX DEFORMED 5TH RIGHT. The patient denies new health changes, fever, chills, wheezing, cough, increased SOB, chest pain, open sores or wounds. No DM or use of blood thinning medication     Vital signs: BP (!) 163/94   Pulse 73   Temp 97.8 °F (36.6 °C) (Temporal)   Resp 18   Ht 5' 3\" (1.6 m)   Wt 200 lb (90.7 kg)   SpO2 98%   BMI 35.43 kg/m²      Allergies:  Aleve [naproxen sodium], Lisinopril-hydrochlorothiazide, Neurontin [gabapentin], and Sulfamethoxazole-trimethoprim    Medications:    Prior to Admission medications    Medication Sig Start Date End Date Taking? Authorizing Provider   losartan (COZAAR) 100 MG tablet  8/9/21   Historical Provider, MD   solifenacin (VESICARE) 5 MG tablet  10/5/21   Historical Provider, MD   traMADol Marcelyn Kidney) 50 MG tablet  8/9/21   Historical Provider, MD         This is a 73 y.o.obese female who is pleasant, cooperative, alert and oriented x3, in no acute distress. Glasses    Heart: Heart sounds are normal.  HR 73 regular rate and rhythm without murmur, gallop or rub. Lungs: Normal respiratory effort with equal expansion, good air exchange, unlabored and clear to auscultation without wheezes or rales bilaterally   Abdomen: soft, obese, nontender, nondistended with bowel sounds. Extremities: 1+ ankle edema bilaterally  DP pulses palpable  No calf tenderness    Labs:  Recent Labs     05/18/22  1031   HGB 13.7   HCT 43.8   WBC 5.1   MCV 84.2         K 4.1      CO2 23   BUN 18   CREATININE 0.63   GLUCOSE 99       No results for input(s): COVID19 in the last 720 hours.     Shell Fuller APRN - CNP  Electronically signed 6/2/2022 at 12:15 PM

## 2022-06-02 NOTE — OP NOTE
PODIATRY OP NOTE    PATIENT NAME: Leila Romero  YOB: 1950  -  73 y.o. female  MRN: [id number]  DATE: 6/2/2022  BILLING #: [id number]    Surgeon(s):  Minerva Rivera DPM     ASSISTANTS: John Cardenas DPM PGY-1    PRE-OP DIAGNOSIS:   1. 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 5th digit pain, right foot    POST-OP DIAGNOSIS: Same as above. PROCEDURE:   1. Derotational arthroplasty, 5th digit, right foot  2. 5th MPJ tenotomy and capsulotomy     ANESTHESIA: MAC    HEMOSTASIS: Pneumatic ankle tourniquet @ 250 mmHg for 29 minutes. ESTIMATED BLOOD LOSS: Less than 3cc. MATERIALS:   * No implants in log *    INJECTABLES: 10cc 1:1 mix of 0.5% marcaine plain and 1% lidocaine plain    SPECIMEN:   * No specimens in log *    COMPLICATIONS: none    FINDINGS: right 5th digit was noted to be in a rectus position post procedure    INDICATION FOR PROCEDURE:  Patient has had pain in the fifth toe on the right foot  for quite some time. Patient has failed multiple conservative treatments. Pre-op X-ray shows adducto varus deformity of 5th digit and joint space narrowing of the 5th PIPJ. Patient has elected to undergo surgery to attempt to relieve pain and deformity. Risks and benefits were discussed with the patient. No guarantees were given or implied. Consent is signed and in the chart. PROCEDURE IN DETAIL:   Patient was taken from pre-op to the operating room and placed on the table in the supine position. A pneumatic ankle tourniquet was placed around the right ankle  Following adequate sedation by Anesthesia a local block of 10cc 1:1 mix of 0.5% marcaine plain and 1% lidocaine plain was given. The foot was then prepped and draped in the normal aseptic manner. The foot was then elevated and Esmarch bandage was used to exsanguinate. The tourniquet was inflated to 250 mmHg.       Attention was directed to the 5th digit where a dorsal eliptical incision was made from dorsolateral proximal to dorsomedial distal over the proximal interphalangeal joint using a #15 blade. The incision was deepened through the level of the subcutaneus tissue utilizing sharp dissection. A joint line was visualized and the proximal interphalangeal joint was identified. The extensor digitorum longus tendon was transected at the joint and was reflected back to visualize the head of the proximal phalanx. The head of the proximal phalanx was then freed of surrounding soft tissues. A sagital saw was then used to resect the head of the proximal phalanx, taking care to avoid the deep flexor tendon. The head of the proximal phalanx was then removed and passed from the table. Post arthroplasty there was still noted to be significant dorsal contracture with dorsal dislocation of the 5th digit at the level of the metatarsophalangeal joint therefore an intraoperative decision was made to perform a tenotomy and capsulotomy of the 5th metatarsophalangeal joint. A percutaneous stab incision was made at the level of the metatarsal phalangeal joint. The extensor tendon as well as the dorsal and medial capsule were identified and sharply transected. Immediately the contracture was noted to be reduced. The surgical site was irrigated with copious amounts of sterile saline. Then using a 4-0 Vicryl the tendon was re approximated. 4-0 prolene was then used to close the skin. Closure of the skin derotated the digit. The digit was noted to be in anatomic alignment. The pneumatic ankle tourniquet was released and immediate hyperemic flush was noted to all five digits of the right foot. Dressings consisted of adaptic, 4 x 4s, Kerlix and an Ace bandage. The patient tolerated the above procedure and anesthesia well without complications. The patient was transported from the operating room to the PACU with vital signs stable and vascular status intact to the right foot.      The patient was then transferred to the [place] [id number] Unit with all vital signs stable and vascular status intact to the right foot. Patient was given postoperative surgical shoe. The patient was given pain medications. The patient was instructed to keep the dressings clean, dry, and intact and to not remove them before the initial post op visit. The patient tolerated the procedure well and the anesthesia with no complications. The patient was counseled at length about the risks of dusty Covid-19 during their perioperative period and any recovery window from their procedure. The patient was made aware that dusty Covid-19  may worsen their prognosis for recovering from their procedure  and lend to a higher morbidity and/or mortality risk. All material risks, benefits, and reasonable alternatives including postponing the procedure were discussed. The patient does wish to proceed with the procedure at this time.     Gloria Balderas DPM   Podiatric Medicine & Surgery   6/2/2022 at 3:00 PM

## 2022-06-02 NOTE — ANESTHESIA PRE PROCEDURE
Department of Anesthesiology  Preprocedure Note       Name:  Joon Torres   Age:  73 y.o.  :  1950                                          MRN:  [id number]         Date:  2022      Surgeon: Arianne Vallejo):  Toma Garza DPM    Procedure: Procedure(s):  ARTHROPLASTY  RIGHT 5TH TOE    Medications prior to admission:   Prior to Admission medications    Medication Sig Start Date End Date Taking? Authorizing Provider   losartan (COZAAR) 100 MG tablet Take 100 mg by mouth daily  21   Historical Provider, MD   solifenacin (VESICARE) 5 MG tablet  10/5/21   Historical Provider, MD   traMADol Dorann Richland) 50 MG tablet  21   Historical Provider, MD       Current medications:    Current Facility-Administered Medications   Medication Dose Route Frequency Provider Last Rate Last Admin    [START ON 6/3/2022] lidocaine PF 1 % injection 1 mL  1 mL IntraDERmal Once PRN Zenaida Melvin MD       [facility] [place] [facility] ON 6/3/2022] lactated ringers infusion   IntraVENous Continuous Zenaida Melvin MD 50 mL/hr at 22 1232 New Bag at 22 1232    sodium chloride flush 0.9 % injection 5-40 mL  5-40 mL IntraVENous 2 times per day Zenaida Melvin MD        sodium chloride flush 0.9 % injection 5-40 mL  5-40 mL IntraVENous PRN Zenaida Melvin MD        0.9 % sodium chloride infusion   IntraVENous PRN Zenaida Melvin MD        ceFAZolin (ANCEF) 2000 mg in dextrose 5 % 50 mL IVPB  2,000 mg IntraVENous Once Toma Garza DPM           Allergies: Allergies   Allergen Reactions    Aleve [Naproxen Sodium]      nosebleed    Lisinopril-Hydrochlorothiazide Swelling    Neurontin [Gabapentin]      halucinations    Sulfamethoxazole-Trimethoprim Swelling     Lip swelling       Problem List:  There is no problem list on file for this patient.       Past Medical History:        Diagnosis Date    Arthritis     Cataract     Hypertension     Incontinence     Wears glasses        Past Surgical History:        Procedure Laterality Date    BREAST BIOPSY Left     markers in place    CATARACT REMOVAL WITH IMPLANT Bilateral     COLONOSCOPY      CYSTOSCOPY      EYE SURGERY      HYSTERECTOMY      JOINT REPLACEMENT      bilateral knees    TOE SURGERY Bilateral     TONSILLECTOMY         Social History:    Social History     Tobacco Use    Smoking status: Never Smoker    Smokeless tobacco: Never Used   Substance Use Topics    Alcohol use: Yes     Comment: social                                Counseling given: Not Answered      Vital Signs (Current):   Vitals:    06/02/22 1217 06/02/22 1233   BP: (!) 163/94    Pulse: 73    Resp: 18    Temp:  97.8 °F (36.6 °C)   TempSrc: Oral Temporal   SpO2: 98%    Weight: 200 lb (90.7 kg)    Height: 5' 3\" (1.6 m)                                               BP Readings from Last 3 Encounters:   06/02/22 (!) 163/94   05/18/22 (!) 155/77   01/24/19 (!) 155/85       NPO Status: Time of last liquid consumption: 2300                        Time of last solid consumption: 2300                        Date of last liquid consumption: 06/01/22                        Date of last solid food consumption: 06/01/22    BMI:   Wt Readings from Last 3 Encounters:   06/02/22 200 lb (90.7 kg)   05/18/22 200 lb (90.7 kg)   11/01/21 204 lb (92.5 kg)     Body mass index is 35.43 kg/m². CBC:   Lab Results   Component Value Date    WBC 5.1 05/18/2022    RBC 5.20 05/18/2022    HGB 13.7 05/18/2022    HCT 43.8 05/18/2022    MCV 84.2 05/18/2022    RDW 14.6 05/18/2022     05/18/2022       CMP:   Lab Results   Component Value Date     05/18/2022    K 4.1 05/18/2022     05/18/2022    CO2 23 05/18/2022    BUN 18 05/18/2022    CREATININE 0.63 05/18/2022    GFRAA >60 05/18/2022    LABGLOM >60 05/18/2022    GLUCOSE 99 05/18/2022    CALCIUM 9.0 05/18/2022       POC Tests: No results for input(s): POCGLU, POCNA, POCK, POCCL, POCBUN, POCHEMO, POCHCT in the last 72 hours.     Coags: No results found for: PROTIME, INR, APTT    HCG (If Applicable): No results found for: PREGTESTUR, PREGSERUM, HCG, HCGQUANT     ABGs: No results found for: PHART, PO2ART, UHO0BDD, TTM0UWF, BEART, G7VHJLNY     Type & Screen (If Applicable):  No results found for: LABABO, LABRH    Drug/Infectious Status (If Applicable):  No results found for: HIV, HEPCAB    COVID-19 Screening (If Applicable): No results found for: COVID19        Anesthesia Evaluation   no history of anesthetic complications:   Airway: Mallampati: II     Neck ROM: full  Mouth opening: > = 3 FB   Dental:          Pulmonary:Negative Pulmonary ROS and normal exam                               Cardiovascular:    (+) hypertension:,     (-)  angina                Neuro/Psych:   Negative Neuro/Psych ROS              GI/Hepatic/Renal:   (+) morbid obesity          Endo/Other:    (+) : arthritis: OA., .                 Abdominal:             Vascular: Other Findings:           Anesthesia Plan      general and TIVA     ASA 2       Induction: intravenous. MIPS: Postoperative opioids intended and Prophylactic antiemetics administered. Anesthetic plan and risks discussed with patient. Plan discussed with CRNA.     Attending anesthesiologist reviewed and agrees with Karen Mcqueen MD   6/2/2022

## 2024-04-08 ENCOUNTER — APPOINTMENT (OUTPATIENT)
Dept: GENERAL RADIOLOGY | Age: 74
End: 2024-04-08
Payer: COMMERCIAL

## 2024-04-08 ENCOUNTER — HOSPITAL ENCOUNTER (EMERGENCY)
Age: 74
Discharge: HOME | End: 2024-04-08
Attending: EMERGENCY MEDICINE
Payer: COMMERCIAL

## 2024-04-08 VITALS
HEIGHT: 62 IN | BODY MASS INDEX: 37.73 KG/M2 | DIASTOLIC BLOOD PRESSURE: 96 MMHG | OXYGEN SATURATION: 100 % | SYSTOLIC BLOOD PRESSURE: 157 MMHG | WEIGHT: 205 LBS | TEMPERATURE: 99 F | HEART RATE: 81 BPM | RESPIRATION RATE: 16 BRPM

## 2024-04-08 DIAGNOSIS — S46.911A STRAIN OF RIGHT SHOULDER, INITIAL ENCOUNTER: ICD-10-CM

## 2024-04-08 DIAGNOSIS — S16.1XXA STRAIN OF NECK MUSCLE, INITIAL ENCOUNTER: ICD-10-CM
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
## 2024-04-08 PROCEDURE — 72040 X-RAY EXAM NECK SPINE 2-3 VW: CPT

## 2024-04-08 PROCEDURE — 99283 EMERGENCY DEPT VISIT LOW MDM: CPT

## 2024-04-08 PROCEDURE — 71101 X-RAY EXAM UNILAT RIBS/CHEST: CPT

## 2024-04-08 PROCEDURE — 73030 X-RAY EXAM OF SHOULDER: CPT

## 2024-04-08 PROCEDURE — 72100 X-RAY EXAM L-S SPINE 2/3 VWS: CPT

## 2024-04-08 PROCEDURE — 72072 X-RAY EXAM THORAC SPINE 3VWS: CPT

## 2024-04-08 ASSESSMENT — ENCOUNTER SYMPTOMS
FACIAL SWELLING: 0
EYE DISCHARGE: 0
ABDOMINAL DISTENTION: 0
BACK PAIN: 0
SHORTNESS OF BREATH: 0
EYE PAIN: 0
CHEST TIGHTNESS: 0
ABDOMINAL PAIN: 0

## 2024-04-08 ASSESSMENT — PAIN - FUNCTIONAL ASSESSMENT: PAIN_FUNCTIONAL_ASSESSMENT: 0-10

## 2024-04-08 ASSESSMENT — PAIN DESCRIPTION - FREQUENCY: FREQUENCY: CONTINUOUS

## 2024-04-08 ASSESSMENT — PAIN DESCRIPTION - DESCRIPTORS: DESCRIPTORS: SHARP

## 2024-04-08 ASSESSMENT — PAIN SCALES - GENERAL: PAINLEVEL_OUTOF10: 4
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
## 2024-04-08 NOTE — ED NOTES
Pt presenting to the ED with complaints of shoulder pain, rib pain as well as knee ain and headache. Pt reports she was in an [place] the other day. Pt si A&Ox4.

## 2024-10-29 ENCOUNTER — HOSPITAL ENCOUNTER (EMERGENCY)
Age: 74
Discharge: HOME | End: 2024-10-29
Attending: STUDENT IN AN ORGANIZED HEALTH CARE EDUCATION/TRAINING PROGRAM
Payer: MEDICARE

## 2024-10-29 ENCOUNTER — APPOINTMENT (OUTPATIENT)
Dept: GENERAL RADIOLOGY | Age: 74
End: 2024-10-29
Payer: MEDICARE

## 2024-10-29 VITALS
SYSTOLIC BLOOD PRESSURE: 156 MMHG | BODY MASS INDEX: 31.5 KG/M2 | TEMPERATURE: 97.9 F | DIASTOLIC BLOOD PRESSURE: 93 MMHG | WEIGHT: 196 LBS | HEIGHT: 66 IN | OXYGEN SATURATION: 98 % | HEART RATE: 85 BPM | RESPIRATION RATE: 16 BRPM

## 2024-10-29 DIAGNOSIS — S46.911A STRAIN OF RIGHT SHOULDER, INITIAL ENCOUNTER: Primary | ICD-10-CM

## 2024-10-29 PROCEDURE — 96372 THER/PROPH/DIAG INJ SC/IM: CPT

## 2024-10-29 PROCEDURE — 73080 X-RAY EXAM OF ELBOW: CPT

## 2024-10-29 PROCEDURE — 6360000002 HC RX W HCPCS: Performed by: PHYSICIAN ASSISTANT

## 2024-10-29 PROCEDURE — 99284 EMERGENCY DEPT VISIT MOD MDM: CPT

## 2024-10-29 PROCEDURE — 73030 X-RAY EXAM OF SHOULDER: CPT

## 2024-10-29 RX ORDER — KETOROLAC TROMETHAMINE 15 MG/ML
15 INJECTION, SOLUTION INTRAMUSCULAR; INTRAVENOUS ONCE
Status: COMPLETED | OUTPATIENT
Start: 2024-10-29 | End: 2024-10-29

## 2024-10-29 RX ORDER — BENZONATATE 100 MG/1
CAPSULE ORAL
COMMUNITY
Start: 2024-10-18

## 2024-10-29 RX ORDER — METHOCARBAMOL 500 MG/1
500 TABLET, FILM COATED ORAL 3 TIMES DAILY
Qty: 30 TABLET | Refills: 0 | Status: SHIPPED | OUTPATIENT
Start: 2024-10-29 | End: 2024-11-08

## 2024-10-29 RX ORDER — ACETAMINOPHEN AND CODEINE PHOSPHATE 300; 30 MG/1; MG/1
1-2 TABLET ORAL EVERY 8 HOURS PRN
Qty: 12 TABLET | Refills: 0 | Status: SHIPPED | OUTPATIENT
Start: 2024-10-29 | End: 2024-11-03

## 2024-10-29 RX ORDER — PREDNISONE 10 MG/1
TABLET ORAL
COMMUNITY
Start: 2024-10-18 | End: 2024-10-30

## 2024-10-29 RX ORDER — SPIRONOLACTONE 50 MG/1
50 TABLET, FILM COATED ORAL DAILY
COMMUNITY

## 2024-10-29 RX ADMIN — KETOROLAC TROMETHAMINE 15 MG: 15 INJECTION, SOLUTION INTRAMUSCULAR; INTRAVENOUS at 20:18

## 2024-10-29 ASSESSMENT — PAIN DESCRIPTION - ORIENTATION
ORIENTATION: RIGHT
ORIENTATION: RIGHT

## 2024-10-29 ASSESSMENT — PAIN DESCRIPTION - LOCATION
LOCATION: SHOULDER
LOCATION: ARM

## 2024-10-29 ASSESSMENT — PAIN - FUNCTIONAL ASSESSMENT: PAIN_FUNCTIONAL_ASSESSMENT: 0-10

## 2024-10-29 ASSESSMENT — PAIN SCALES - GENERAL
PAINLEVEL_OUTOF10: 8
PAINLEVEL_OUTOF10: 8
PAINLEVEL_OUTOF10: 9

## 2024-10-29 NOTE — ED PROVIDER NOTES
eMERGENCY dEPARTMENT eNCOUnter   Independent Attestation     Pt Name: Kimmie Rhodes  MRN: [id number]  Birthdate 1950  Date of evaluation: 10/29/24     Kimmie Rhodes is a 73 y.o. female with CC: Shoulder Pain (Right, slipped and fell tonight ) and Arm Pain (Right )        This visit was performed by both a physician and an APC. I performed all aspects of the MDM as documented.      Yoni Garcia DO  Attending Emergency Physician         Yoni Garcia DO  10/29/24 1940

## 2024-10-30 NOTE — ED PROVIDER NOTES
[facility] ED  eMERGENCY dEPARTMENTeNCOUnter      Pt Name: Kimmie Rhodes  MRN: [id number]  Birthdate 1950  Date ofevaluation: 10/29/2024  Provider: Yoni Alba PA-C    CHIEF COMPLAINT       Chief Complaint   Patient presents with    Shoulder Pain     Right, slipped and fell tonight     Arm Pain     Right          HISTORY OF PRESENT ILLNESS  (Location/Symptom, Timing/Onset, Context/Setting, Quality, Duration, Modifying Factors, Severity.)   Kimmie Rhodes is a 73 y.o. female who presents to the emergency department with right shoulder and elbow pain status post fall at a restaurant today.  Pain worse to move relieved with rest.  Did not hit head or neck.  Fall is mechanical.  No other complaints.      Nursing Notes were reviewed.    ALLERGIES     Aleve [naproxen sodium], Lisinopril-hydrochlorothiazide, Neurontin [gabapentin], and Sulfamethoxazole-trimethoprim    CURRENT MEDICATIONS       Discharge Medication List as of 10/29/2024  9:46 PM        CONTINUE these medications which have NOT CHANGED    Details   spironolactone (ALDACTONE) 50 MG tablet Take 1 tablet by mouth dailyHistorical Med      benzonatate (TESSALON) 100 MG capsule TAKE 1 CAPSULE BY MOUTH EVERY 6 HOURS AS NEEDED FOR COUGH UP TO 10 DAYSHistorical Med      predniSONE (DELTASONE) 10 MG tablet predniSONE (DELTASONE) 10 mg tablet Indications: Acute bronchitis, unspecified organism Six tabs daily for 3 days, 4 tabs daily for 3 days, 2 tabs daily for 3 days, 1 tab daily for 3 days 39 tablet 10/18/2024 10/30/2024 ActiveHistorical Med      losartan (COZAAR) 100 MG tablet Take 1 tablet by mouth dailyHistorical Med      solifenacin (VESICARE) 5 MG tablet Historical Med      traMADol (ULTRAM) 50 MG tablet Historical Med             PAST MEDICAL HISTORY         Diagnosis Date    Arthritis     Cataract     Hypertension     Incontinence     Wears glasses        SURGICAL HISTORY           Procedure Laterality Date    ARTHROPLASTY Right

## 2024-10-30 NOTE — ED NOTES
Patient arrived to ED with c/o right shoulder pain due to a fall. Patient reported she was at a restaurant prior to arriving to the ED. Patient reported their was a slippery substance on the floor without a wet floor sign present. Patient then fell on the floor landing directly on her right shoulder. Patient reports having limited mobility to her shoulder and having difficulties lifting it above her head.

## (undated) DEVICE — INTENDED FOR TISSUE SEPARATION, AND OTHER PROCEDURES THAT REQUIRE A SHARP SURGICAL BLADE TO PUNCTURE OR CUT.: Brand: BARD-PARKER ® CARBON RIB-BACK BLADES

## (undated) DEVICE — SUTURE VCRL SZ 5-0 L18IN ABSRB UD P-3 L13MM 3/8 CIR PRIM J493H

## (undated) DEVICE — Device

## (undated) DEVICE — SKIN PREP TRAY W/CHG: Brand: MEDLINE INDUSTRIES, INC.

## (undated) DEVICE — SPONGE GZ W2XL2IN NONWOVEN 4 PLY FASTER WICKING ABIL AVANT

## (undated) DEVICE — SUTURE ETHLN SZ 4-0 L18IN NONABSORBABLE BLK L19MM PS-2 3/8 1667H

## (undated) DEVICE — SOLUTION IV IRRIG 500ML 0.9% SODIUM CHL 2F7123

## (undated) DEVICE — SUTURE VCRL SZ 3-0 L27IN ABSRB UD L19MM PS-2 3/8 CIR PRIM J427H

## (undated) DEVICE — APPLICATOR MEDICATED 26 CC SOLUTION HI LT ORNG CHLORAPREP

## (undated) DEVICE — GLOVE SURG SZ 65 CRM LTX FREE POLYISOPRENE POLYMER BEAD ANTI

## (undated) DEVICE — NEEDLE HYPO 25GA L1.5IN BLU POLYPR HUB S STL REG BVL STR

## (undated) DEVICE — SMALL TEAR CROSS CUT RASP (11.0 X 5.0MM)

## (undated) DEVICE — STRIP SKIN CLSR W0.25XL4IN WHT SPUNBOUND FBR NYL HI ADH

## (undated) DEVICE — SUTURE VCRL SZ 4-0 L27IN ABSRB UD L19MM PS-2 3/8 CIR PRIM J426H

## (undated) DEVICE — PRECISION THIN (9.0 X 0.38 X 25.0MM)